# Patient Record
Sex: FEMALE | Race: WHITE | NOT HISPANIC OR LATINO | URBAN - METROPOLITAN AREA
[De-identification: names, ages, dates, MRNs, and addresses within clinical notes are randomized per-mention and may not be internally consistent; named-entity substitution may affect disease eponyms.]

---

## 2017-03-28 ENCOUNTER — OFFICE (OUTPATIENT)
Dept: URBAN - METROPOLITAN AREA CLINIC 11 | Facility: CLINIC | Age: 62
End: 2017-03-28

## 2017-03-28 VITALS
SYSTOLIC BLOOD PRESSURE: 121 MMHG | DIASTOLIC BLOOD PRESSURE: 79 MMHG | HEIGHT: 60 IN | WEIGHT: 121 LBS | HEART RATE: 86 BPM

## 2017-03-28 DIAGNOSIS — B18.2 CHRONIC VIRAL HEPATITIS C: ICD-10-CM

## 2017-03-28 PROCEDURE — 99203 OFFICE O/P NEW LOW 30 MIN: CPT | Performed by: INTERNAL MEDICINE

## 2017-03-28 NOTE — SERVICENOTES
We discussed her chronic hepatitis C, history of normal LFTS, prior recs for tx, the new recs for tx, tx options, and the initial labs prior to starting tx.  We discussed the medicaiton options for hepatitis C, potential risks, clearance rates, pitfalls, and typical time to getting her meds/potential insurance issues.

## 2017-03-28 NOTE — SERVICEHPINOTES
She presents for evlaution of her Hepatitis C.  She thinks that she has had hepatitis C since about the mid 1970s in the her  at the time had non A/non B hepatitis.  She was treated with immunoglobulins at the time.  She was first told that she had hep C in 1995 by Dr. Fermin.  She was not treated at the time.  She has had annual labs wtih Dr. Mcduffie and her last labs were in the summer of 2016.   She had Parvo at the time which she may have gotten from [employer].  She has thought that her LFTS have been normal during the course of her hepatitis C.  She has not had an u/s in several years. She does not use drugs and did not use ETOH.She has had a normal heart score last year.  She had a pulmonary exposure to windex a few years ago and does not had COPD.  She has not had renal issues.

## 2017-05-09 ENCOUNTER — OFFICE (OUTPATIENT)
Dept: URBAN - METROPOLITAN AREA CLINIC 11 | Facility: CLINIC | Age: 62
End: 2017-05-09

## 2017-05-09 VITALS
WEIGHT: 119 LBS | SYSTOLIC BLOOD PRESSURE: 128 MMHG | HEART RATE: 87 BPM | DIASTOLIC BLOOD PRESSURE: 74 MMHG | HEIGHT: 60 IN

## 2017-05-09 DIAGNOSIS — B18.2 CHRONIC VIRAL HEPATITIS C: ICD-10-CM

## 2017-05-09 LAB
Lab: (no result)
REQUEST PROBLEM: (no result)

## 2017-05-09 PROCEDURE — 99213 OFFICE O/P EST LOW 20 MIN: CPT | Performed by: INTERNAL MEDICINE

## 2017-05-09 NOTE — SERVICENOTES
We discussed the HCV genotype 1a and need for subtyping to help choose an appropriate medicaiton option due to the potential of resistance.  We discussed her u/s and CT.  Without cirrhosis, we can hold on variceal screening by EGD. However, given the length of time of her Hep C, I would like a fibroscan.  I will add the TSH incase Ribaviron is needed.

## 2017-05-09 NOTE — SERVICEHPINOTES
She presents for f/u of her hepatitis C.  She has not had particular GI related issues.  Her CT and u/s were benign and her labs revealed vaccination for Hep B, Genotype 1a HCV with loads of 4 million.  Her other serologies were negative and she is planning on getting her hep A vaccine.

## 2017-05-10 LAB
CBC COMPLETE BLOOD COUNT W/O DIFF: HEMATOCRIT: 43.2 % (ref 36–48)
CBC COMPLETE BLOOD COUNT W/O DIFF: HEMOGLOBIN: 14.1 G/DL (ref 12–16)
CBC COMPLETE BLOOD COUNT W/O DIFF: MCH: 30.1 PG (ref 25–35)
CBC COMPLETE BLOOD COUNT W/O DIFF: MCHC: 32.6 % (ref 30–38)
CBC COMPLETE BLOOD COUNT W/O DIFF: MCV: 92.3 FL (ref 78–102)
CBC COMPLETE BLOOD COUNT W/O DIFF: PLATELET COUNT: 268 K/UL (ref 150–450)
CBC COMPLETE BLOOD COUNT W/O DIFF: RBC DISTRIBUTION WIDTH: 13 % (ref 11.5–16)
CBC COMPLETE BLOOD COUNT W/O DIFF: RED BLOOD CELL COUNT: 4.68 M/UL (ref 4–5.5)
CBC COMPLETE BLOOD COUNT W/O DIFF: WHITE BLOOD CELL COUNT: 4.9 K/UL (ref 4–11)
HEPATIC FUNCTION PANEL A: ALBUMIN: 4.7 G/DL (ref 3.5–5.2)
HEPATIC FUNCTION PANEL A: ALKALINE PHOSPHATASE: 68 U/L (ref 38–134)
HEPATIC FUNCTION PANEL A: DIRECT BILIRUBIN: 0.3 MG/DL — HIGH (ref 0–0.2)
HEPATIC FUNCTION PANEL A: SGOT (AST): 142 U/L — HIGH (ref 13–40)
HEPATIC FUNCTION PANEL A: SGPT (ALT): 194 U/L — HIGH (ref 7–52)
HEPATIC FUNCTION PANEL A: TOTAL BILIRUBIN: 0.8 MG/DL (ref 0.3–1.2)
HEPATIC FUNCTION PANEL A: TOTAL PROTEIN: 7.3 G/DL (ref 6.4–8.3)
PROTHROMBIN TIME: INR VALUE: 1.06
PROTHROMBIN TIME: MEAN NORMAL: 13.1 SECONDS
PROTHROMBIN TIME: PATIENT: 13.7 SECONDS (ref 12.1–14.2)
THYROID STIMULATING HORMONE: TSH: 1.33 MIU/L (ref 0.3–4.2)

## 2017-05-12 ENCOUNTER — OFFICE (OUTPATIENT)
Dept: URBAN - METROPOLITAN AREA CLINIC 14 | Facility: CLINIC | Age: 62
End: 2017-05-12

## 2017-05-12 DIAGNOSIS — B18.2 CHRONIC VIRAL HEPATITIS C: ICD-10-CM

## 2017-05-12 PROCEDURE — 91200 LIVER ELASTOGRAPHY: CPT | Performed by: INTERNAL MEDICINE

## 2017-07-27 ENCOUNTER — OFFICE (OUTPATIENT)
Dept: URBAN - METROPOLITAN AREA CLINIC 11 | Facility: CLINIC | Age: 62
End: 2017-07-27

## 2017-07-27 VITALS
DIASTOLIC BLOOD PRESSURE: 72 MMHG | SYSTOLIC BLOOD PRESSURE: 129 MMHG | HEIGHT: 60 IN | WEIGHT: 122 LBS | RESPIRATION RATE: 16 BRPM | HEART RATE: 83 BPM

## 2017-07-27 DIAGNOSIS — B18.2 CHRONIC VIRAL HEPATITIS C: ICD-10-CM

## 2017-07-27 PROCEDURE — 99213 OFFICE O/P EST LOW 20 MIN: CPT | Performed by: INTERNAL MEDICINE

## 2017-07-27 NOTE — SERVICENOTES
We discussed tx with Harvoni for the HCV and depening on the resistance testing the potential of Ribaviron.  We discussed the fibroscan results with possible F2 fibrosis as well as her abnormal LFTs.  Her LFTS are a bit higher than expected for HCV alone and if they are still up, we discussed doing a liver bx, noting her history of autoimmune issues (prior to starting meds).

## 2017-07-28 LAB
BASIC METABOLIC PANEL: CALCIUM TOTAL: 9.8 MG/DL (ref 8.5–10.5)
BASIC METABOLIC PANEL: CARBON DIOXIDE: 27 MEQ/L (ref 21–31)
BASIC METABOLIC PANEL: CHLORIDE: 99 MEQ/L (ref 96–106)
BASIC METABOLIC PANEL: CREATININE: 0.88 MG/DL (ref 0.6–1.3)
BASIC METABOLIC PANEL: GLUCOSE: 80 MG/DL (ref 65–100)
BASIC METABOLIC PANEL: POTASSIUM: 4.6 MEQ/ML (ref 3.5–5.4)
BASIC METABOLIC PANEL: SODIUM: 139 MEQ/L (ref 135–145)
BASIC METABOLIC PANEL: UREA NITROGEN: 7 MG/DL — LOW (ref 8–23)
CBC COMPLETE BLOOD COUNT W/O DIFF: HEMATOCRIT: 40.9 % (ref 36–48)
CBC COMPLETE BLOOD COUNT W/O DIFF: HEMOGLOBIN: 13.7 G/DL (ref 12–16)
CBC COMPLETE BLOOD COUNT W/O DIFF: MCH: 31.6 PG (ref 25–35)
CBC COMPLETE BLOOD COUNT W/O DIFF: MCHC: 33.5 % (ref 30–38)
CBC COMPLETE BLOOD COUNT W/O DIFF: MCV: 94.5 FL (ref 78–102)
CBC COMPLETE BLOOD COUNT W/O DIFF: PLATELET COUNT: 248 K/UL (ref 150–450)
CBC COMPLETE BLOOD COUNT W/O DIFF: RBC DISTRIBUTION WIDTH: 13.2 % (ref 11.5–16)
CBC COMPLETE BLOOD COUNT W/O DIFF: RED BLOOD CELL COUNT: 4.33 M/UL (ref 4–5.5)
CBC COMPLETE BLOOD COUNT W/O DIFF: WHITE BLOOD CELL COUNT: 5.2 K/UL (ref 4–11)
HCV GENOTYPE REFLEX NS5A: HEPATITIS C GENOTYPE: (no result)
HCV GENOTYPE REFLEX NS5A: PLEASE NOTE: (no result)
HCV NS5A DRUG RESISTANCE ASSAY: (no result)
HCV NS5A DRUG RESISTANCE ASSAY: HCV NS5A RESIST ASSAY INTERP: (no result)
HCV NS5A DRUG RESISTANCE ASSAY: HCV NS5A RESISTANCE ASSAY PDF: (no result)
HEPATIC FUNCTION PANEL A: ALBUMIN: 4.2 G/DL (ref 3.5–5.2)
HEPATIC FUNCTION PANEL A: ALKALINE PHOSPHATASE: 45 U/L (ref 38–134)
HEPATIC FUNCTION PANEL A: DIRECT BILIRUBIN: 0.5 MG/DL — HIGH (ref 0–0.2)
HEPATIC FUNCTION PANEL A: SGOT (AST): 77 U/L — HIGH (ref 13–40)
HEPATIC FUNCTION PANEL A: SGPT (ALT): 111 U/L — HIGH (ref 7–52)
HEPATIC FUNCTION PANEL A: TOTAL BILIRUBIN: 0.9 MG/DL (ref 0.3–1.2)
HEPATIC FUNCTION PANEL A: TOTAL PROTEIN: 6.9 G/DL (ref 6.4–8.3)
PROTHROMBIN TIME: INR VALUE: 1.06
PROTHROMBIN TIME: MEAN NORMAL: 13.1 SECONDS
PROTHROMBIN TIME: PATIENT: 13.7 SECONDS (ref 12.1–14.2)

## 2017-08-04 ENCOUNTER — OFFICE (OUTPATIENT)
Dept: URBAN - METROPOLITAN AREA CLINIC 11 | Facility: CLINIC | Age: 62
End: 2017-08-04
Payer: COMMERCIAL

## 2017-08-04 DIAGNOSIS — Z23 ENCOUNTER FOR IMMUNIZATION: ICD-10-CM

## 2017-08-04 DIAGNOSIS — B18.2 CHRONIC VIRAL HEPATITIS C: ICD-10-CM

## 2017-08-04 PROCEDURE — 90632 HEPA VACCINE ADULT IM: CPT | Performed by: INTERNAL MEDICINE

## 2017-08-28 ENCOUNTER — OFFICE (OUTPATIENT)
Dept: URBAN - METROPOLITAN AREA CLINIC 11 | Facility: CLINIC | Age: 62
End: 2017-08-28
Payer: COMMERCIAL

## 2017-08-28 DIAGNOSIS — B18.2 CHRONIC VIRAL HEPATITIS C: ICD-10-CM

## 2017-08-28 PROCEDURE — 88307 TISSUE EXAM BY PATHOLOGIST: CPT | Performed by: INTERNAL MEDICINE

## 2017-08-28 PROCEDURE — 88313 SPECIAL STAINS GROUP 2: CPT | Performed by: INTERNAL MEDICINE

## 2017-08-28 PROCEDURE — 88342 IMHCHEM/IMCYTCHM 1ST ANTB: CPT | Performed by: INTERNAL MEDICINE

## 2017-09-26 ENCOUNTER — OFFICE (OUTPATIENT)
Dept: URBAN - METROPOLITAN AREA CLINIC 11 | Facility: CLINIC | Age: 62
End: 2017-09-26

## 2017-09-26 VITALS
WEIGHT: 123 LBS | SYSTOLIC BLOOD PRESSURE: 130 MMHG | HEIGHT: 60 IN | HEART RATE: 78 BPM | DIASTOLIC BLOOD PRESSURE: 73 MMHG

## 2017-09-26 DIAGNOSIS — B18.9 CHRONIC VIRAL HEPATITIS, UNSPECIFIED: ICD-10-CM

## 2017-09-26 LAB
HEPATIC FUNCTION PANEL (7): ALBUMIN, SERUM: 4.1 G/DL (ref 3.6–4.8)
HEPATIC FUNCTION PANEL (7): ALKALINE PHOSPHATASE, S: 49 IU/L (ref 39–117)
HEPATIC FUNCTION PANEL (7): ALT (SGPT): 124 IU/L — HIGH (ref 0–32)
HEPATIC FUNCTION PANEL (7): AST (SGOT): 106 IU/L — HIGH (ref 0–40)
HEPATIC FUNCTION PANEL (7): BILIRUBIN, DIRECT: 0.47 MG/DL — HIGH (ref 0–0.4)
HEPATIC FUNCTION PANEL (7): BILIRUBIN, TOTAL: 1 MG/DL (ref 0–1.2)
HEPATIC FUNCTION PANEL (7): PROTEIN, TOTAL, SERUM: 6.9 G/DL (ref 6–8.5)

## 2017-09-26 PROCEDURE — 99213 OFFICE O/P EST LOW 20 MIN: CPT | Performed by: INTERNAL MEDICINE

## 2017-09-26 RX ORDER — GLECAPREVIR AND PIBRENTASVIR 40; 100 MG/1; MG/1
TABLET, FILM COATED ORAL
Qty: 90 | Refills: 1 | Status: ACTIVE
Start: 2017-09-26

## 2017-09-26 NOTE — SERVICEHPINOTES
She presents for f/u of her hepatitis C genotype 1a.  Her liver bx revealed a fibrosis score of 1/4.  Her fibroscan was consistent with a stage 2 fibrosis.  She has not been cirrhotic.  She has not had issues with rashes, icterus, abdomina, n/v or other issues.  She has had her u/s this year.  Her resistance testing was negative.

## 2017-09-26 NOTE — SERVICENOTES
We discussed her genotype 1a HCV and grade 1 fibrosis on BX.  We discussed tx options and inparticular the use of Mavyret for 8 weeks.  We discussed the potential side effect issues and overall clearance rates.

## 2017-12-14 ENCOUNTER — OFFICE (OUTPATIENT)
Dept: URBAN - METROPOLITAN AREA CLINIC 11 | Facility: CLINIC | Age: 62
End: 2017-12-14

## 2017-12-14 DIAGNOSIS — B18.9 CHRONIC VIRAL HEPATITIS, UNSPECIFIED: ICD-10-CM

## 2018-01-04 ENCOUNTER — OFFICE (OUTPATIENT)
Dept: URBAN - METROPOLITAN AREA CLINIC 11 | Facility: CLINIC | Age: 63
End: 2018-01-04

## 2018-01-04 VITALS
WEIGHT: 124 LBS | SYSTOLIC BLOOD PRESSURE: 146 MMHG | HEART RATE: 85 BPM | DIASTOLIC BLOOD PRESSURE: 79 MMHG | HEIGHT: 60 IN

## 2018-01-04 DIAGNOSIS — B18.2 CHRONIC VIRAL HEPATITIS C: ICD-10-CM

## 2018-01-04 LAB
CBC, PLATELET, NO DIFFERENTIAL: HEMATOCRIT: 42.6 % (ref 34–46.6)
CBC, PLATELET, NO DIFFERENTIAL: HEMOGLOBIN: 14.5 G/DL (ref 11.1–15.9)
CBC, PLATELET, NO DIFFERENTIAL: MCH: 32 PG (ref 26.6–33)
CBC, PLATELET, NO DIFFERENTIAL: MCHC: 34 G/DL (ref 31.5–35.7)
CBC, PLATELET, NO DIFFERENTIAL: MCV: 94 FL (ref 79–97)
CBC, PLATELET, NO DIFFERENTIAL: PLATELETS: 245 X10E3/UL (ref 150–379)
CBC, PLATELET, NO DIFFERENTIAL: RBC: 4.53 X10E6/UL (ref 3.77–5.28)
CBC, PLATELET, NO DIFFERENTIAL: RDW: 13 % (ref 12.3–15.4)
CBC, PLATELET, NO DIFFERENTIAL: WBC: 6.6 X10E3/UL (ref 3.4–10.8)
COMP. METABOLIC PANEL (14): A/G RATIO: 1.5 (ref 1.2–2.2)
COMP. METABOLIC PANEL (14): ALBUMIN, SERUM: 4.2 G/DL (ref 3.6–4.8)
COMP. METABOLIC PANEL (14): ALKALINE PHOSPHATASE, S: 40 IU/L (ref 39–117)
COMP. METABOLIC PANEL (14): ALT (SGPT): 23 IU/L (ref 0–32)
COMP. METABOLIC PANEL (14): AST (SGOT): 23 IU/L (ref 0–40)
COMP. METABOLIC PANEL (14): BILIRUBIN, TOTAL: 0.5 MG/DL (ref 0–1.2)
COMP. METABOLIC PANEL (14): BUN/CREATININE RATIO: 13 (ref 12–28)
COMP. METABOLIC PANEL (14): BUN: 12 MG/DL (ref 8–27)
COMP. METABOLIC PANEL (14): CALCIUM, SERUM: 9.4 MG/DL (ref 8.7–10.3)
COMP. METABOLIC PANEL (14): CARBON DIOXIDE, TOTAL: 24 MMOL/L (ref 18–29)
COMP. METABOLIC PANEL (14): CHLORIDE, SERUM: 100 MMOL/L (ref 96–106)
COMP. METABOLIC PANEL (14): CREATININE, SERUM: 0.95 MG/DL (ref 0.57–1)
COMP. METABOLIC PANEL (14): EGFR IF AFRICN AM: 74 ML/MIN/1.73 (ref 59–?)
COMP. METABOLIC PANEL (14): EGFR IF NONAFRICN AM: 64 ML/MIN/1.73 (ref 59–?)
COMP. METABOLIC PANEL (14): GLOBULIN, TOTAL: 2.8 G/DL (ref 1.5–4.5)
COMP. METABOLIC PANEL (14): GLUCOSE, SERUM: 95 MG/DL (ref 65–99)
COMP. METABOLIC PANEL (14): POTASSIUM, SERUM: 4.2 MMOL/L (ref 3.5–5.2)
COMP. METABOLIC PANEL (14): PROTEIN, TOTAL, SERUM: 7 G/DL (ref 6–8.5)
COMP. METABOLIC PANEL (14): SODIUM, SERUM: 137 MMOL/L (ref 134–144)
HCV RT-PCR, QUANT (NON-GRAPH): HCV LOG10: (no result)
HCV RT-PCR, QUANT (NON-GRAPH): HEPATITIS C QUANTITATION: (no result) IU/ML
HCV RT-PCR, QUANT (NON-GRAPH): TEST INFORMATION: (no result)

## 2018-01-04 PROCEDURE — 99213 OFFICE O/P EST LOW 20 MIN: CPT | Performed by: INTERNAL MEDICINE

## 2018-01-04 RX ORDER — LEDIPASVIR AND SOFOSBUVIR 90; 400 MG/1; MG/1
TABLET, FILM COATED ORAL
Qty: 30 | Refills: 0 | Status: COMPLETED
Start: 2018-01-04 | End: 2018-03-06

## 2018-01-04 NOTE — SERVICENOTES
We discussed her Harvoni, potential side effect issues, the 12 week course, insurance changes, and her f/u labs.  She is to call if there are changes while on the meds.

## 2018-01-04 NOTE — SERVICEHPINOTES
She has completed the first 4 weeks of 12 weeks of Harvoni.  She has been doing well and taking the meds consistently.  She has had some nausea randomly and has also had some feelings of being off balance without other symptoms.  She has not had URI issues.  This has only happened with bending over and looking athteh floor, otherwise she has been normal.  She has not had other new meds.

## 2018-03-06 ENCOUNTER — OFFICE (OUTPATIENT)
Dept: URBAN - METROPOLITAN AREA CLINIC 11 | Facility: CLINIC | Age: 63
End: 2018-03-06

## 2018-03-06 VITALS
SYSTOLIC BLOOD PRESSURE: 146 MMHG | WEIGHT: 125 LBS | HEIGHT: 60 IN | HEART RATE: 69 BPM | DIASTOLIC BLOOD PRESSURE: 87 MMHG

## 2018-03-06 DIAGNOSIS — B18.2 CHRONIC VIRAL HEPATITIS C: ICD-10-CM

## 2018-03-06 LAB
COMP. METABOLIC PANEL (14): A/G RATIO: 1.5 (ref 1.2–2.2)
COMP. METABOLIC PANEL (14): ALBUMIN, SERUM: 4.2 G/DL (ref 3.6–4.8)
COMP. METABOLIC PANEL (14): ALKALINE PHOSPHATASE, S: 42 IU/L (ref 39–117)
COMP. METABOLIC PANEL (14): ALT (SGPT): 15 IU/L (ref 0–32)
COMP. METABOLIC PANEL (14): AST (SGOT): 18 IU/L (ref 0–40)
COMP. METABOLIC PANEL (14): BILIRUBIN, TOTAL: 0.3 MG/DL (ref 0–1.2)
COMP. METABOLIC PANEL (14): BUN/CREATININE RATIO: 10 — LOW (ref 12–28)
COMP. METABOLIC PANEL (14): BUN: 9 MG/DL (ref 8–27)
COMP. METABOLIC PANEL (14): CALCIUM, SERUM: 9.7 MG/DL (ref 8.7–10.3)
COMP. METABOLIC PANEL (14): CARBON DIOXIDE, TOTAL: 25 MMOL/L (ref 18–29)
COMP. METABOLIC PANEL (14): CHLORIDE, SERUM: 101 MMOL/L (ref 96–106)
COMP. METABOLIC PANEL (14): CREATININE, SERUM: 0.89 MG/DL (ref 0.57–1)
COMP. METABOLIC PANEL (14): EGFR IF AFRICN AM: 80 ML/MIN/1.73 (ref 59–?)
COMP. METABOLIC PANEL (14): EGFR IF NONAFRICN AM: 69 ML/MIN/1.73 (ref 59–?)
COMP. METABOLIC PANEL (14): GLOBULIN, TOTAL: 2.8 G/DL (ref 1.5–4.5)
COMP. METABOLIC PANEL (14): GLUCOSE, SERUM: 88 MG/DL (ref 65–99)
COMP. METABOLIC PANEL (14): POTASSIUM, SERUM: 3.9 MMOL/L (ref 3.5–5.2)
COMP. METABOLIC PANEL (14): PROTEIN, TOTAL, SERUM: 7 G/DL (ref 6–8.5)
COMP. METABOLIC PANEL (14): SODIUM, SERUM: 141 MMOL/L (ref 134–144)
HCV RT-PCR, QUANT (NON-GRAPH): HCV LOG10: (no result)
HCV RT-PCR, QUANT (NON-GRAPH): HEPATITIS C QUANTITATION: (no result) IU/ML
HCV RT-PCR, QUANT (NON-GRAPH): TEST INFORMATION: (no result)

## 2018-03-06 PROCEDURE — 99213 OFFICE O/P EST LOW 20 MIN: CPT | Performed by: INTERNAL MEDICINE

## 2018-03-06 NOTE — SERVICENOTES
She has been doing well and has not had any new particular issues. We discussed her HCV clearance and risks of recurrent issues.  We discussed her f/u labs and f/u screening with risks of HCC.

## 2018-03-06 NOTE — SERVICEHPINOTES
She presents for f/u of her HCV.  She has completed her Harvoni and has done very well.  Her last HCV RNA was negative.  She has not had any ETOH.  She has not had issues with f/c, abdominal pain, or other new GI issues.  She has not had cirrhosis on BX or by fibroscan. Her last LFTS were normal and her CBC was normal.

## 2018-09-18 ENCOUNTER — OFFICE (OUTPATIENT)
Dept: URBAN - METROPOLITAN AREA CLINIC 11 | Facility: CLINIC | Age: 63
End: 2018-09-18

## 2018-09-18 VITALS
HEIGHT: 60 IN | SYSTOLIC BLOOD PRESSURE: 140 MMHG | WEIGHT: 132 LBS | HEART RATE: 78 BPM | DIASTOLIC BLOOD PRESSURE: 80 MMHG

## 2018-09-18 DIAGNOSIS — B18.2 CHRONIC VIRAL HEPATITIS C: ICD-10-CM

## 2018-09-18 LAB
COMP. METABOLIC PANEL (14): A/G RATIO: 1.6 (ref 1.2–2.2)
COMP. METABOLIC PANEL (14): ALBUMIN: 4.2 G/DL (ref 3.6–4.8)
COMP. METABOLIC PANEL (14): ALKALINE PHOSPHATASE: 45 IU/L (ref 39–117)
COMP. METABOLIC PANEL (14): ALT (SGPT): 16 IU/L (ref 0–32)
COMP. METABOLIC PANEL (14): AST (SGOT): 22 IU/L (ref 0–40)
COMP. METABOLIC PANEL (14): BILIRUBIN, TOTAL: 0.4 MG/DL (ref 0–1.2)
COMP. METABOLIC PANEL (14): BUN/CREATININE RATIO: 10 — LOW (ref 12–28)
COMP. METABOLIC PANEL (14): BUN: 8 MG/DL (ref 8–27)
COMP. METABOLIC PANEL (14): CALCIUM: 9.4 MG/DL (ref 8.7–10.3)
COMP. METABOLIC PANEL (14): CARBON DIOXIDE, TOTAL: 25 MMOL/L (ref 20–29)
COMP. METABOLIC PANEL (14): CHLORIDE: 96 MMOL/L (ref 96–106)
COMP. METABOLIC PANEL (14): CREATININE: 0.83 MG/DL (ref 0.57–1)
COMP. METABOLIC PANEL (14): EGFR IF AFRICN AM: 86 ML/MIN/1.73 (ref 59–?)
COMP. METABOLIC PANEL (14): EGFR IF NONAFRICN AM: 75 ML/MIN/1.73 (ref 59–?)
COMP. METABOLIC PANEL (14): GLOBULIN, TOTAL: 2.7 G/DL (ref 1.5–4.5)
COMP. METABOLIC PANEL (14): GLUCOSE: 83 MG/DL (ref 65–99)
COMP. METABOLIC PANEL (14): POTASSIUM: 4.1 MMOL/L (ref 3.5–5.2)
COMP. METABOLIC PANEL (14): PROTEIN, TOTAL: 6.9 G/DL (ref 6–8.5)
COMP. METABOLIC PANEL (14): SODIUM: 136 MMOL/L (ref 134–144)

## 2018-09-18 PROCEDURE — 99213 OFFICE O/P EST LOW 20 MIN: CPT | Performed by: INTERNAL MEDICINE

## 2018-09-18 NOTE — SERVICENOTES
She has done well after her tx.  She will need her f/u RNA in the Spring.  She is due for her f/u CT of the liver.

## 2018-09-18 NOTE — SERVICEHPINOTES
She presents for f/u of her Hepatitis C treated with Harvoni.  Her last viral load was negative in the Spring.  She did have a possible lesion on the liver on u/s which was not noted on CT.  She had a liver bx as well with some fibrosis but not cirrhosis.  Her fibroscan was consistent with the F2 fibrosis on bx.  She has not reported new issues.Her last colonoscopy wa sin 2013.